# Patient Record
Sex: FEMALE | Race: WHITE | Employment: UNEMPLOYED | URBAN - METROPOLITAN AREA
[De-identification: names, ages, dates, MRNs, and addresses within clinical notes are randomized per-mention and may not be internally consistent; named-entity substitution may affect disease eponyms.]

---

## 2018-06-27 LAB
25(OH)D3 SERPL-MCNC: 20.4 NG/ML (ref 32–100)
A-G RATIO,AGRAT: 1.6 RATIO (ref 1.1–2.6)
ABSOLUTE LYMPHOCYTE COUNT, 10803: 2.6 K/UL (ref 1–4.8)
ALBUMIN SERPL-MCNC: 4.3 G/DL (ref 3.5–5)
ALP SERPL-CCNC: 65 U/L (ref 25–115)
ALT SERPL-CCNC: 46 U/L (ref 5–40)
ANION GAP SERPL CALC-SCNC: 17 MMOL/L
AST SERPL W P-5'-P-CCNC: 34 U/L (ref 10–37)
B BURGDOR IGG+IGM SER-ACNC: <0.9 INDEX
B BURGDOR IGG+IGM SER-ACNC: <0.9 INDEX
BASOPHILS # BLD: 0.1 K/UL (ref 0–0.2)
BASOPHILS NFR BLD: 1 % (ref 0–2)
BILIRUB SERPL-MCNC: 0.4 MG/DL (ref 0.2–1.2)
BUN SERPL-MCNC: 12 MG/DL (ref 6–22)
C-REACTIVE PROTEIN, QT, 006627: 0.3 MG/DL (ref 0–0.5)
C3 SERPL-MCNC: 164 MG/DL (ref 83–177)
C4 SERPL-MCNC: 25 MG/DL (ref 10–40)
CALCIUM SERPL-MCNC: 9.1 MG/DL (ref 8.4–10.5)
CCP ANTIBODY IGG,99138601: <0.5 U/ML
CHLORIDE SERPL-SCNC: 100 MMOL/L (ref 98–110)
CHOLEST SERPL-MCNC: 230 MG/DL (ref 110–200)
CO2 SERPL-SCNC: 23 MMOL/L (ref 20–32)
CREAT SERPL-MCNC: 0.7 MG/DL (ref 0.5–1.2)
EOSINOPHIL # BLD: 0.2 K/UL (ref 0–0.5)
EOSINOPHIL NFR BLD: 3 % (ref 0–6)
ERYTHROCYTE [DISTWIDTH] IN BLOOD BY AUTOMATED COUNT: 13.6 % (ref 10–15.5)
ESTRADIOL: <5 NG/L
FOLATE,FOL: 8.86 NG/ML
FSH SERPL-ACNC: 62.1 MIU/ML
GFRAA, 66117: >60
GFRNA, 66118: >60
GGT SERPL-CCNC: 64 U/L (ref 5–60)
GLOBULIN,GLOB: 2.7 G/DL (ref 2–4)
GLUCOSE SERPL-MCNC: 91 MG/DL (ref 70–99)
GRANULOCYTES,GRANS: 59 % (ref 40–75)
HCT VFR BLD AUTO: 43.4 % (ref 35.1–48)
HDLC SERPL-MCNC: 43 MG/DL (ref 40–59)
HDLC SERPL-MCNC: 5.3 MG/DL (ref 0–5)
HGB BLD-MCNC: 14.1 G/DL (ref 11.7–16)
LDLC SERPL CALC-MCNC: 153 MG/DL (ref 50–99)
LH SERPL-ACNC: 40.1 MIU/ML
LYMPHOCYTES, LYMLT: 29 % (ref 20–45)
MCH RBC QN AUTO: 28 PG (ref 26–34)
MCHC RBC AUTO-ENTMCNC: 33 G/DL (ref 31–36)
MCV RBC AUTO: 88 FL (ref 80–95)
MONOCYTES # BLD: 0.8 K/UL (ref 0.1–1)
MONOCYTES NFR BLD: 9 % (ref 3–12)
NEUTROPHILS # BLD AUTO: 5.4 K/UL (ref 1.8–7.7)
PLATELET # BLD AUTO: 336 K/UL (ref 140–440)
PMV BLD AUTO: 11.6 FL (ref 9–13)
POTASSIUM SERPL-SCNC: 4.8 MMOL/L (ref 3.5–5.5)
PROGESTERONE,PROGS: 0.1 NG/ML
PROT SERPL-MCNC: 7 G/DL (ref 6.4–8.3)
RBC # BLD AUTO: 4.96 M/UL (ref 3.8–5.2)
RHEUMATOID FACTOR QUANT, IMMUNOTURBIDIMETRIC: 96 IU/ML (ref 0–20)
SED RATE (ESR): 18 MM/HR (ref 0–20)
SODIUM SERPL-SCNC: 140 MMOL/L (ref 133–145)
T4 SERPL-MCNC: 7.1 MCG/DL (ref 4.5–10.9)
TRIGL SERPL-MCNC: 172 MG/DL (ref 40–149)
TSH SERPL DL<=0.005 MIU/L-ACNC: 2.38 MCU/ML (ref 0.27–4.2)
VIT B12 SERPL-MCNC: 362 PG/ML (ref 211–911)
VLDLC SERPL CALC-MCNC: 34 MG/DL (ref 8–30)
WBC # BLD AUTO: 9.1 K/UL (ref 4–11)

## 2018-06-28 LAB
ANA SCREEN, 8017: NEGATIVE
B BURGDOR IGG+IGM SER-ACNC: <0.9 INDEX
B BURGDOR IGG+IGM SER-ACNC: <0.9 INDEX

## 2018-06-29 LAB
ACE,ACE: 58 U/L
B BURGDOR IGG+IGM SER-ACNC: <0.9 INDEX
B BURGDOR IGG+IGM SER-ACNC: <0.9 INDEX
Lab: 240 NG/DL

## 2022-06-23 ENCOUNTER — HOSPITAL ENCOUNTER (OUTPATIENT)
Dept: NUTRITION | Age: 52
Discharge: HOME | End: 2022-06-23
Payer: COMMERCIAL

## 2022-06-23 PROCEDURE — 97802 MEDICAL NUTRITION INDIV IN: CPT

## 2022-06-23 NOTE — PROGRESS NOTES
[facility] [place] - Outpatient Nutrition Services  [address] [place] [address] [place] [address]  Phone: [phone] Fax: [phone]   Nutrition Assessment - Medical Nutrition Therapy   Outpatient Initial Evaluation         Patient Name: Liz Joya : 1970   Treatment Diagnosis: Morbid obesity   Referral Source: Alvin Schuler MD Start of Care [facility]): 2022     Gender: female Age: 46 y.o. Ht: 68 In Wt: 302.6 lb  kg   BMI: 55 BMR   Male  BMR Female      Past Medical History:  Fibromyalgia; chronic fatigue; IBS; pre diabetes     Pertinent Medications:        Biochemical Data:   Lab Results   Component Value Date/Time    Hemoglobin A1c 5.7 (H) 2011 09:58 AM     Lab Results   Component Value Date/Time    Sodium 140 2018 08:47 AM    Potassium 4.8 2018 08:47 AM    Chloride 100 2018 08:47 AM    CO2 23 2018 08:47 AM    Anion gap 17.0 2018 08:47 AM    Glucose 91 2018 08:47 AM    BUN 12 2018 08:47 AM    Creatinine 0.7 2018 08:47 AM    BUN/Creatinine ratio 14 2011 09:58 AM    GFR est AA 91 2011 09:58 AM    GFR est non-AA 79 2011 09:58 AM    Calcium 9.1 2018 08:47 AM    Bilirubin, total 0.4 2018 08:47 AM    Alk. phosphatase 65 2018 08:47 AM    Protein, total 7.0 2018 08:47 AM    Albumin 4.3 2018 08:47 AM    Globulin 2.7 2018 08:47 AM    A-G Ratio 1.6 2018 08:47 AM    ALT (SGPT) 46 (H) 2018 08:47 AM    AST (SGOT) 34 2018 08:47 AM     Lab Results   Component Value Date/Time    Cholesterol, total 230 (H) 2018 08:47 AM    HDL Cholesterol 43 2018 08:47 AM    LDL, calculated 153 (H) 2018 08:47 AM    VLDL, calculated 34 (H) 2018 08:47 AM    Triglyceride 172 (H) 2018 08:47 AM     Lab Results   Component Value Date/Time    ALT (SGPT) 46 (H) 2018 08:47 AM    AST (SGOT) 34 2018 08:47 AM    GGT 64 (H) 2018 08:47 AM    Alk. phosphatase 65 06/27/2018 08:47 AM    Bilirubin, total 0.4 06/27/2018 08:47 AM     Lab Results   Component Value Date/Time    Creatinine 0.7 06/27/2018 08:47 AM     Lab Results   Component Value Date/Time    BUN 12 06/27/2018 08:47 AM     No results found for: MCACR, MCA1, MCA2, MCA3, MCAU, MCAU2, MCALPOCT     Assessment:   Pt referred to nutrition counseling for assistance with weight loss. Pt reports multiple attempts of losing weight especially over the past 19 years since the birth of her 2 boys. Pt currently struggles with arthritis, chronic fatigue and potentially fibromyalgia, which per pt immobilizes her. Pt c/o low energy levels which increase her desire to eat. Pt admits she eats out of every emotion. Suggested pt also look into therapy to help her address the addictive personality (pt quit smoking 9 years ago and quit drinking alcohol 5 years ago). Pt maintains her goal is to increase energy and strength most of all. Pt no longer has the energy or stamina to work or volunteer. She works out with a  (virtually) 5d/wk including bike riding and [employer] training. Pt tends to have an \"all or nothing\" attitude when it comes to dieting and would prefer a less aggressive but specific meal plan (which was provided). Moderate compliance expected. GBW (initial) 170 (10% loss CBW). Food & Nutrition: B- steel cut oats + bag frozen broccoli +/- egg whites or omelet OR sausage + english muffin + frozen broccoli  L- salad + chicken + dressing OR sandwich + chips  Sn- chips, ding dong, granola bar  D- meatloaf or pasta or  sub    Pt's current diet high in \"free\" veggies and carbohydrate and low in protein. Pt admits to using too many condiments regularly as well. Pt's diet inconsistent in both macronutrient composition and meal timing, leading to an inefficient RMR.         Estimate Needs   Calories: 1600  Protein: 120 Carbs: 160 Fat: 53   Kcal/day  g/day  g/day  g/day        percent: 30  40 30               Nutrition Diagnosis Morbid Obesity related to increased PO intake and lack of activity as evidenced by high BMI (46) and unhealthy diet and exercise recall. Nutrition Intervention &  Education: Provided macronutrient education, including optimal times to eat throughout the day and appropriate balance of macronutrients to promote more efficient RMR. Discussed the importance of developing a consistent lifestyle, including eating habits for long term weight loss and management. Provided pt with meal builder and diet plan. Handouts Provided: []  Carbohydrates  []  Protein  []  Non-starchy Vegatbles  []  Food Label  []  Meal and Snack Ideas  []  Food Journals []  Diabetes  []  Cholesterol  []  Sodium  [x]  Meal Builder  [x]  Diet Plan  []  Others:   Information Reviewed with: pt   Readiness to Change Stage: []  Pre-contemplative    [x]  Contemplative  []  Preparation               []  Action                  []  Maintenance   Potential Barriers to Learning: []  Decline in memory    []  Language barrier   []  Other:  [x]  Emotional                  []  Limited mobility  []  Lack of motivation     [] Vision, hearing or cognitive impairment   Expected Compliance:  Fair      Nutritional Goal - To promote lifestyle changes to result in:    [x]  Weight loss  [x]  Improved diabetic control  [x]  Decreased cholesterol levels  []  Decreased blood pressure  []  Weight maintenance []  Preventing any interactions associated with food allergies  []  Adequate weight gain toward goal weight  []  Other:        Patient Goals:   1. Balance carb and protein at every meal/snack  2.  Eat 2-3 hrs after snacks and 4-5 hrs after meals     Dietitian Signature: Mark Granado MS, RD Date: 6/23/2022   Follow-up: 7/12 Time: 12:16 PM

## 2022-07-12 ENCOUNTER — HOSPITAL ENCOUNTER (OUTPATIENT)
Dept: NUTRITION | Age: 52
Discharge: HOME | End: 2022-07-12
Payer: COMMERCIAL

## 2022-07-12 PROCEDURE — 97803 MED NUTRITION INDIV SUBSEQ: CPT

## 2022-07-14 NOTE — PROGRESS NOTES
NUTRITION - FOLLOW-UP TREATMENT NOTE  Patient Name: Mauricio Rowe         Date: 2022  : 1970    YES/NO Patient  Verified  Diagnosis: morbid obesity   In time:   80             Out time:   1000   Total Treatment Time (min):   30     SUBJECTIVE/ASSESSMENT    Current Wt: 310.6 Previous Wt: 302.6 Wt Change: +8     Initial Wt:  Total Wt change:        Changes in medication or medical history? Any new allergies, surgeries or procedures? NO    If yes, update Summary List                Nutrition Diagnosis        Diagnosis Status: Morbid Obesity related to increased PO intake and lack of activity as evidenced by high BMI (46) and unhealthy diet and exercise recall. []  Improved []  No Change    [x]  Declined        Nutrition Monitoring and Evaluation: Pt reported splurging over bday week with her family. Pt feels slightly overwhelmed with all of the choices provided and prefers to have fewer options and more specific recommendations (ie menu). Discussed the importance of meal planning and prepping in advance. Answered pt's questions and reviewed guidelines with pt. Patient Education:  []  Review current plan with patient   []  Other:    Handouts/  Information Provided: []  Carbohydrates  []  Protein  []  Fiber  []  Serving Sizes  []  Fluids  []  General guidelines []  Diabetes  []  Cholesterol  []  Sodium  []  SBGM  []  Food Journals  []  Others:        Patient Goals 1. Break up any meal/snack into a smaller meal snack as needed and adjust eating schedule accordingly  2. Plan/prepare meals and snacks at the same time you prepare kids'  3.  Keep a food journal daily       PLAN    [x]  Continue on current plan []  Follow-up PRN   []  Discharge due to :    [x]  Next appt: 8.11     Dietitian: Cb Sung MS, RD    Date: 2022 Time: 1:31 PM

## 2022-08-02 NOTE — PROGRESS NOTES
NUTRITION - FOLLOW-UP TREATMENT NOTE  Patient Name: Ashleigh Dudley         Date: 2022  : 1970    YES/NO Patient  Verified  Diagnosis: morbid obesity   In time:   80             Out time:   1000   Total Treatment Time (min):   30     SUBJECTIVE/ASSESSMENT    Current Wt: 310.6 Previous Wt: 302.6 Wt Change: +8     Initial Wt:  Total Wt change:        Changes in medication or medical history? Any new allergies, surgeries or procedures? NO    If yes, update Summary List                Nutrition Diagnosis        Diagnosis Status: Morbid Obesity related to increased PO intake and lack of activity as evidenced by high BMI (46) and unhealthy diet and exercise recall. []  Improved []  No Change    [x]  Declined        Nutrition Monitoring and Evaluation: Pt reports several occasions to celebrate over past few weeks. Pt admits to overindulging at times. Pt not discouraged by results, but remains motivated to continue working toward goals. Reviewed importance of balancing lean protein with carbohydrate and overall eating schedule.           Patient Education:  [x]  Review current plan with patient   []  Other:    Handouts/  Information Provided: []  Carbohydrates  []  Protein  []  Fiber  []  Serving Sizes  []  Fluids  []  General guidelines []  Diabetes  []  Cholesterol  []  Sodium  []  SBGM  []  Food Journals  []  Others:        Patient Goals Break up any meal into smaller snacks as needed  Plan meals/snacks in advance  Keep a daily food journal       PLAN    [x]  Continue on current plan []  Follow-up PRN   []  Discharge due to :    [x]  Next appt:      Dietitian: Rajan Garcia MS, RD    Date: 2022 Time: 9:43 AM